# Patient Record
(demographics unavailable — no encounter records)

---

## 2024-10-27 NOTE — PHYSICAL EXAM
[No Acute Distress] : no acute distress [Well Nourished] : well nourished [Well-Appearing] : well-appearing [Normal Sclera/Conjunctiva] : normal sclera/conjunctiva [PERRL] : pupils equal round and reactive to light [Normal Outer Ear/Nose] : the outer ears and nose were normal in appearance [Normal Oropharynx] : the oropharynx was normal [No Lymphadenopathy] : no lymphadenopathy [Supple] : supple [No Respiratory Distress] : no respiratory distress  [No Accessory Muscle Use] : no accessory muscle use [Clear to Auscultation] : lungs were clear to auscultation bilaterally [Normal Rate] : normal rate  [Regular Rhythm] : with a regular rhythm [Normal S1, S2] : normal S1 and S2 [Soft] : abdomen soft [Non Tender] : non-tender [Non-distended] : non-distended [Normal Bowel Sounds] : normal bowel sounds [No Joint Swelling] : no joint swelling [Grossly Normal Strength/Tone] : grossly normal strength/tone [No Rash] : no rash [Coordination Grossly Intact] : coordination grossly intact [No Focal Deficits] : no focal deficits [Normal Gait] : normal gait [Deep Tendon Reflexes (DTR)] : deep tendon reflexes were 2+ and symmetric [Memory Grossly Normal] : memory grossly normal [Normal Affect] : the affect was normal [Normal Insight/Judgement] : insight and judgment were intact

## 2024-10-27 NOTE — HEALTH RISK ASSESSMENT
[Good] : ~his/her~ current health as good [Excellent] : ~his/her~  mood as  excellent [Yes] : Yes [2 - 4 times a month (2 pts)] : 2-4 times a month (2 points) [1 or 2 (0 pts)] : 1 or 2 (0 points) [Never (0 pts)] : Never (0 points) [No] : In the past 12 months have you used drugs other than those required for medical reasons? No [Little interest or pleasure doing things] : 1) Little interest or pleasure doing things [Feeling down, depressed, or hopeless] : 2) Feeling down, depressed, or hopeless [0] : 2) Feeling down, depressed, or hopeless: Not at all (0) [PHQ-2 Negative - No further assessment needed] : PHQ-2 Negative - No further assessment needed [Never] : Never [NO] : No [HIV test declined] : HIV test declined [Hepatitis C test declined] : Hepatitis C test declined [None] : None [With Family] : lives with family [# of Members in Household ___] :  household currently consist of [unfilled] member(s) [Employed] : employed [College] : College [Single] : single [Feels Safe at Home] : Feels safe at home [Reports normal functional visual acuity (ie: able to read med bottle)] : Reports normal functional visual acuity [Smoke Detector] : smoke detector [Carbon Monoxide Detector] : carbon monoxide detector [Safety elements used in home] : safety elements used in home [Seat Belt] :  uses seat belt [Sunscreen] : uses sunscreen [With Patient/Caregiver] : , with patient/caregiver [Designated Healthcare Proxy] : Designated healthcare proxy [Name: ___] : Health Care Proxy's Name: [unfilled]  [Relationship: ___] : Relationship: [unfilled] [Audit-CScore] : 2 [de-identified] : three days a week weights, walking [[id number]] : 0 [de-identified] : trying to eat healthier, watching portions [Change in mental status noted] : No change in mental status noted [Language] : denies difficulty with language [Learning/Retaining New Information] : denies difficulty learning/retaining new information [Handling Complex Tasks] : denies difficulty handling complex tasks [Sexually Active] : not sexually active [High Risk Behavior] : no high risk behavior [Reports changes in hearing] : Reports no changes in hearing [Reports changes in vision] : Reports no changes in vision [Reports changes in dental health] : Reports no changes in dental health [Travel to Developing Areas] : does not  travel to developing areas [TB Exposure] : is not being exposed to tuberculosis [Caregiver Concerns] : does not have caregiver concerns [PapSmearDate] : 05/23 [AdvancecareDate] : 10/24 [FreeTextEntry4] : Secondary Health Care Proxy: friend; Julian Wheatley

## 2024-10-27 NOTE — PLAN
[FreeTextEntry1] : VISION SCREENING SAFETY / HEALTHY HABITS REVIEWED HEALTHY DIET AND LIFESTYLE MODIFICATIONS VACCINATIONS DISCUSSED: COVID, FLU CHECK ROUTINE LAB WORK FOLLOW-UP ALL SPECIALISTS AS DIRECTED; NAMES OF ALTERNATE ENDOCRINOLOGISTS GIVEN ROUTINE GYN EXAMS CALL WITH ANY QUESTIONS, CONCERNS OR CHANGES

## 2024-10-27 NOTE — HEALTH RISK ASSESSMENT
[Good] : ~his/her~ current health as good [Excellent] : ~his/her~  mood as  excellent [Yes] : Yes [2 - 4 times a month (2 pts)] : 2-4 times a month (2 points) [1 or 2 (0 pts)] : 1 or 2 (0 points) [Never (0 pts)] : Never (0 points) [No] : In the past 12 months have you used drugs other than those required for medical reasons? No [Little interest or pleasure doing things] : 1) Little interest or pleasure doing things [Feeling down, depressed, or hopeless] : 2) Feeling down, depressed, or hopeless [0] : 2) Feeling down, depressed, or hopeless: Not at all (0) [PHQ-2 Negative - No further assessment needed] : PHQ-2 Negative - No further assessment needed [Never] : Never [NO] : No [HIV test declined] : HIV test declined [Hepatitis C test declined] : Hepatitis C test declined [None] : None [With Family] : lives with family [# of Members in Household ___] :  household currently consist of [unfilled] member(s) [Employed] : employed [College] : College [Single] : single [Feels Safe at Home] : Feels safe at home [Reports normal functional visual acuity (ie: able to read med bottle)] : Reports normal functional visual acuity [Smoke Detector] : smoke detector [Carbon Monoxide Detector] : carbon monoxide detector [Safety elements used in home] : safety elements used in home [Seat Belt] :  uses seat belt [Sunscreen] : uses sunscreen [With Patient/Caregiver] : , with patient/caregiver [Designated Healthcare Proxy] : Designated healthcare proxy [Name: ___] : Health Care Proxy's Name: [unfilled]  [Relationship: ___] : Relationship: [unfilled] [Audit-CScore] : 2 [de-identified] : three days a week weights, walking [de-identified] : trying to eat healthier, watching portions [[id number]] : 0 [Change in mental status noted] : No change in mental status noted [Language] : denies difficulty with language [Learning/Retaining New Information] : denies difficulty learning/retaining new information [Handling Complex Tasks] : denies difficulty handling complex tasks [Sexually Active] : not sexually active [High Risk Behavior] : no high risk behavior [Reports changes in hearing] : Reports no changes in hearing [Reports changes in vision] : Reports no changes in vision [Reports changes in dental health] : Reports no changes in dental health [Travel to Developing Areas] : does not  travel to developing areas [TB Exposure] : is not being exposed to tuberculosis [Caregiver Concerns] : does not have caregiver concerns [PapSmearDate] : 05/23 [AdvancecareDate] : 10/24 [FreeTextEntry4] : Secondary Health Care Proxy: friend; Julian Wheatley

## 2024-10-27 NOTE — HISTORY OF PRESENT ILLNESS
[FreeTextEntry1] : physical [de-identified] : MS. DONOHUE IS A PLEASANT 36 YO PRESENTING FOR YEALRY PHYSICAL.  HISTORY OF PCOS, HYPERCHOLESTEROLEMIA, VITAMIN D DEFICIENCY, OBESITY AS WELL AS ASTHMA.  STARTED ON METFORMIN BY ENDOCRINOLOGY BUT STOPPED IT AFTER A WEEK AS DIDN'T FEEL WELL ON IT.  WOULD LIKE TO SEE ALTERNATE ENDOCRINOLOGIST.  STARTED SEEING A DIETICIAN.  IS FEELING WELL TODAY. Lmp: 10/5/24

## 2024-10-27 NOTE — HISTORY OF PRESENT ILLNESS
[FreeTextEntry1] : physical [de-identified] : MS. DONOHUE IS A PLEASANT 36 YO PRESENTING FOR YEALRY PHYSICAL.  HISTORY OF PCOS, HYPERCHOLESTEROLEMIA, VITAMIN D DEFICIENCY, OBESITY AS WELL AS ASTHMA.  STARTED ON METFORMIN BY ENDOCRINOLOGY BUT STOPPED IT AFTER A WEEK AS DIDN'T FEEL WELL ON IT.  WOULD LIKE TO SEE ALTERNATE ENDOCRINOLOGIST.  STARTED SEEING A DIETICIAN.  IS FEELING WELL TODAY. Lmp: 10/5/24

## 2024-11-26 NOTE — PHYSICAL EXAM
[de-identified] :                    Well appearing and nourished with no obvious deformities or distress.  Eyes:  No conjunctival injection and no xanthelasmas. HEENT:  Normocephalic.Normal oral mucosa. No pallor or cyanosis Neck:  No jugular venous distension. with normal A and V wave forms. No palpable adenopathy. Cardiovascular:  Normal rate and rhythm with normal S1, S2 and a grade 1/6 systolic murmur. Distal arterial pulses are normal. No significant peripheral edema. Pulmonary:  Lungs are clear to auscultation and percussion. Normal respiratory pattern without any accessory muscle use Abdomen:  Soft, non-tender ; no palpable organomegaly or masses. Extremities: No digital clubbing, cyanosis or ischemic changes. Skin:  No skin lesions, rashes, ulcers or xanthomas. Psychiatric:  Alert and oriented to person, place and time. Appropriate mood and affect.

## 2024-11-26 NOTE — ASSESSMENT
[FreeTextEntry1] : ECG: Normal sinus rhythm at 63 beats minute.  Normal axis and intervals.  No significant ST-T wave changes.  Laboratory data: ----10/26/2024 Chol----223 HDL----68 LDL---138 Trigs---101 P9a----5.2  Impression: 1.  Chest pain is not due to ischemic etiology.  2.  Sudden onset of chest pain in association with calf pain, raising the concern that this could have been a DVT and PE presentation. There was no D-dimer performed.  There was no CT of the chest.  3.  Mild obesity with BMI - 31  4.  Hyperlipidemia longstanding in nature which has been fairly constant.  Plan: 1.  Chest CT rule out PE protocol.  2.  Instructed the patient about the benefits of a diet that restricts portion sizes, increases frequency of meals and consists of  vegetables, (more green and leafy),fruit and nuts, whole grains, lean proteins and limits carbohydrates and meat and dairy fats  3.  The patient was instructed to follow a symptom limited regimen of moderate aerobic exercise for 30 minutes 3 to 4 days a week. A warm up and cool down period are to be added to the regimen and small incremental changes can be made every few weeks. Any new symptoms of exercise related chest pain or dyspnea should be reported.  4.  Repeat blood work in 3 months and if LDL still persistently elevated we will need to begin statin therapy.  Patient understands the implications of the above.

## 2024-11-26 NOTE — REASON FOR VISIT
[FreeTextEntry1] : 37 old female presents here for cardiac evaluation following an ER hospital visit on 11/21/2024. Patient awakened from sleep with left calf pain which lasted about 5 hours. Coincident with that she began having left upper chest discomfort which lasted 2 to 3 hours.  Discomfort was constant in nature and not related to position movement and breathing. She has not had any the symptoms before and they all spontaneously dissipated without intervention.  ER workup consisted of blood work and a chest x-ray  There is no history of chest wall trauma and there is no major exertional activity in the day or 2 before.  Patient walks regularly and has no exertional difficulty. There is no pre-existing history of any cardiovascular disease. History of hyperlipidemia Denies tobacco use, diabetes, hypertension. No family history premature coronary disease.  Only other medical history is that of PCOS.

## 2025-01-09 NOTE — HISTORY OF PRESENT ILLNESS
[N] : Patient denies prior pregnancies [Regular Cycle Intervals] : periods have been regular [Frequency: Q ___ days] : menstrual periods occur approximately every [unfilled] days [FreeTextEntry1] : 38-year-old G0, P0 last menstrual cycle was December 2024 presents for routine GYN evaluation.  She is presently not on metformin, metformin does not agree with her, and she reports regular menstrual cycle.  She is presently not sexually active and uses nothing for contraception. [PGHxTotal] : 0 [PGHxFullTerm] : 0 [PGHxPremature] : 0 [PGHxAbortions] : 0 [BannerxLiving] : 0 [PGHxABInduced] : 0 [PGHxABSpont] : 0 [PGHxEctopic] : 0 [PGHxMultBirths] : 0

## 2025-01-09 NOTE — PHYSICAL EXAM
[Chaperone Present] : A chaperone was present in the examining room during all aspects of the physical examination [[id number]] : A chaperone was present during the pelvic exam. [Appropriately responsive] : appropriately responsive [Alert] : alert [No Acute Distress] : no acute distress [No Lymphadenopathy] : no lymphadenopathy [Regular Rate Rhythm] : regular rate rhythm [No Murmurs] : no murmurs [Clear to Auscultation B/L] : clear to auscultation bilaterally [Soft] : soft [Non-tender] : non-tender [Non-distended] : non-distended [No HSM] : No HSM [No Lesions] : no lesions [No Mass] : no mass [Oriented x3] : oriented x3 [Examination Of The Breasts] : a normal appearance [No Masses] : no breast masses were palpable [Labia Majora] : normal [Labia Minora] : normal [Normal] : normal [Uterine Adnexae] : normal [Declined] : Patient declined rectal exam [FreeTextEntry2] : Casie [FreeTextEntry5] : Cervical ectropion noted and patient complains of mucousy discharge on regular basis.

## 2025-01-09 NOTE — DISCUSSION/SUMMARY
[FreeTextEntry1] : 38-year-old  with history of polycystic ovarian syndrome presents for follow-up care.  Denies pain bleeding or discharge.  No longer on metformin because he did not agree with her.  Physical exam is normal.  Cervical ectropion noted Pap GC chlamydia sent.  Diet and exercise reviewed and patient to report back in 1 year for follow-up.

## 2025-01-09 NOTE — HISTORY OF PRESENT ILLNESS
[N] : Patient denies prior pregnancies [Regular Cycle Intervals] : periods have been regular [Frequency: Q ___ days] : menstrual periods occur approximately every [unfilled] days [FreeTextEntry1] : 38-year-old G0, P0 last menstrual cycle was December 2024 presents for routine GYN evaluation.  She is presently not on metformin, metformin does not agree with her, and she reports regular menstrual cycle.  She is presently not sexually active and uses nothing for contraception. [PGHxTotal] : 0 [PGHxFullTerm] : 0 [PGHxPremature] : 0 [PGHxAbortions] : 0 [[facility] Hospital[facility]Living] : 0 [PGHxABInduced] : 0 [PGHxABSpont] : 0 [PGHxEctopic] : 0 [PGHxMultBirths] : 0

## 2025-01-09 NOTE — PHYSICAL EXAM
[Chaperone Present] : A chaperone was present in the examining room during all aspects of the physical examination [[id number]] : A chaperone was present during the pelvic exam. [Appropriately responsive] : appropriately responsive [Alert] : alert [No Acute Distress] : no acute distress [No Lymphadenopathy] : no lymphadenopathy [Regular Rate Rhythm] : regular rate rhythm [No Murmurs] : no murmurs [Clear to Auscultation B/L] : clear to auscultation bilaterally [Soft] : soft [Non-tender] : non-tender [Non-distended] : non-distended [No HSM] : No HSM [No Lesions] : no lesions [No Mass] : no mass [Oriented x3] : oriented x3 [Examination Of The Breasts] : a normal appearance [No Masses] : no breast masses were palpable [Labia Majora] : normal [Labia Minora] : normal [Normal] : normal [Uterine Adnexae] : normal [Declined] : Patient declined rectal exam [FreeTextEntry2] : Casie [FreeTextEntry5] : Cervical ectropion noted and patient complains of mucousy discharge on regular basis.

## 2025-03-13 NOTE — DISCUSSION/SUMMARY
[FreeTextEntry1] :  #1. Will schedule lung function testing in near future to assess lung function. FeNO from 3/13/25 was low at 16 ppb. #2. The patient does not appear to require chronic BD therapy at this time but continue Albuterol prn. #3. Diet and exercise for weight loss. #4. SOBOE is likely at least somewhat related to weight or deconditioning. #5. CXR to evaluate SOBOE. #6. Denies sleep complaints. #7. Never smoker. #8. S/p Covid vaccines and infections. #9. Flonase nasal spray for postnasal drip as needed. Zyrtec/Allegra/Claritin for allergies as needed. #10. F/u in 8 weeks with laly and CXR.   The patient expressed understanding and agreement with the above recommendations/plan and accepts responsibility to be compliant with recommended testing, therapies, and f/u visits. All relevant questions and concerns were addressed.

## 2025-03-13 NOTE — HISTORY OF PRESENT ILLNESS
[Intermittent] : intermittent [Dyspnea on Exertion] : dyspnea on exertion [None] : No associated symptoms are reported [Inhaled Short-Acting Beta-2 Agonists] : inhaled short-acting beta-2 agonists [Good Compliance] : good compliance with treatment [Good Tolerance] : good tolerance of treatment [Good Symptom Control] : good symptom control [Initial Eval - Existing Diagnosis] : an initial evaluation of an existing diagnosis of [Excess Weight] : excess weight [Currently Experiencing] : The patient is currently experiencing symptoms. [TextBox_4] : Never smoker. S/p Covid infections with mild symptoms. Patient c/o SOBOE but is otherwise without associated respiratory complaints.  Possible intermittent or exercise induced asthma.

## 2025-03-13 NOTE — CONSULT LETTER
[Dear  ___] : Dear  [unfilled], [Consult Letter:] : I had the pleasure of evaluating your patient, [unfilled]. [Please see my note below.] : Please see my note below. [Consult Closing:] : Thank you very much for allowing me to participate in the care of this patient.  If you have any questions, please do not hesitate to contact me. [Sincerely,] : Sincerely, [FreeTextEntry3] : Huang Arreaga MD, FCCP, D. ABS[facility] Pulmonary and Sleep Medicine [facility] at [place]

## 2025-03-13 NOTE — PROCEDURE
[FreeTextEntry1] : Lung function testing deferred for now until can be scheduled. FeNO 3/13/25 - 16 ppb (low).

## 2025-03-13 NOTE — END OF VISIT
[Time Spent: ___ minutes] : I have spent [unfilled] minutes of time on the encounter which excludes teaching and separately reported services. [TextEntry] : Discussed with pt at length regarding asthma, soboe, prior Covid infections, obesity; reviewed w/u with pt as above.

## 2025-05-29 NOTE — PROCEDURE
[FreeTextEntry1] : FeNO 3/13/25 - 16 ppb (low). Naveed 5/29/25 - low normal FVC and mildly reduced FEV1 without obstruction.

## 2025-05-29 NOTE — CONSULT LETTER
[Dear  ___] : Dear  [unfilled], [Consult Letter:] : I had the pleasure of evaluating your patient, [unfilled]. [Please see my note below.] : Please see my note below. [Consult Closing:] : Thank you very much for allowing me to participate in the care of this patient.  If you have any questions, please do not hesitate to contact me. [Sincerely,] : Sincerely, [FreeTextEntry3] : Huang Arreaga MD, FCCP, D. ABS[facility] Pulmonary and Sleep Medicine [facility] at [place]

## 2025-05-29 NOTE — RESULTS/DATA
[TextEntry] : CXR from 5/29/25 appeared to be clear on my review but results are pending - reviewed results and films with patient.

## 2025-05-29 NOTE — DISCUSSION/SUMMARY
[FreeTextEntry1] :  #1. FeNO from 3/13/25 was low at 16 ppb. [place] from 5/29/25 revealed a borderline normal FVC and mildly reduced FEV1 but without obstruction. #2. Trial of Breo 100 for asthma with Albuterol prn. #3. Diet and exercise for weight loss. #4. SOBOE is likely at least somewhat related to weight or deconditioning. #5. CXR to evaluate SOBOE on 5/29/25 appeared to be clear but official results are pending. #6. Denies sleep complaints. #7. Never smoker. #8. S/p Covid vaccines and infections. #9. Flonase nasal spray for postnasal drip as needed. Zyrtec/Allegra/Claritin for allergies as needed. #10. F/u in 2-3 months with laly to assess response to Breo 100.   The patient expressed understanding and agreement with the above recommendations/plan and accepts responsibility to be compliant with recommended testing, therapies, and f/u visits. All relevant questions and concerns were addressed.

## 2025-05-29 NOTE — HISTORY OF PRESENT ILLNESS
[Intermittent] : intermittent [Dyspnea on Exertion] : dyspnea on exertion [None] : No associated symptoms are reported [Inhaled Short-Acting Beta-2 Agonists] : inhaled short-acting beta-2 agonists [Good Compliance] : good compliance with treatment [Good Tolerance] : good tolerance of treatment [Good Symptom Control] : good symptom control [Follow-Up - Routine Clinic] : a routine clinic follow-up of [Excess Weight] : excess weight [Currently Experiencing] : The patient is currently experiencing symptoms. [TextBox_4] : Never smoker. S/p Covid infections with mild symptoms. Patient c/o SOBOE but is otherwise without associated respiratory complaints.  Possible intermittent or exercise induced asthma. Pt denies significant sleep complaints.

## 2025-06-05 NOTE — ASSESSMENT
[FreeTextEntry1] : ECG: Normal sinus rhythm at 76beats minute.  Normal axis and intervals.  No significant ST-T wave changes.  Laboratory data: ------10/26/24--5/2925 Chol----223------221 HDL-----68--------60 LDL----138-------140 Trigs---101-------120 B3o-----0.2  Chest CT PE protocol 3/26/2025 Suboptimal contrast bolus Lobar and segmental pulmonary artery showing no filling defects.   (More distal subsegmental pulmonary arteries are not adequately visualized) No other significant intrathoracic abnormality is identified.   Impression: 1.  Chest pain is not due to ischemic etiology.  Chest CT does not show any significant intrathoracic pathology. Larger vessel pulmonary arteries do not show any evidence of thrombus.  2.  Sudden onset of chest pain in association with calf pain, raising the concern that this could have been a DVT and PE presentation.  While there was nearly a 4-month interval between the ER visit and the CT, no pulmonary embolus has been identified. There was no D-dimer performed.  There was no CT of the chest.  3.  Mild obesity with BMI - 31  4.  Hyperlipidemia unchanged despite more aggressive diet and exercise.  Plan: 1.  Continuation of atorvastatin 20 mg daily with blood work at a 3-month interval.  2.  Instructed the patient about the benefits of a diet that restricts portion sizes, increases frequency of meals and consists of  vegetables, (more green and leafy),fruit and nuts, whole grains, lean proteins and limits carbohydrates and meat and dairy fats  3.  The patient was instructed to follow a symptom limited regimen of moderate aerobic exercise for 30 minutes 3 to 4 days a week. A warm up and cool down period are to be added to the regimen and small incremental changes can be made every few weeks. Any new symptoms of exercise related chest pain or dyspnea should be reported.  4.  No indication for any other cardiac testing and a 1 year follow-up is recommended.

## 2025-06-05 NOTE — PHYSICAL EXAM
[de-identified] :                    Well appearing and nourished with no obvious deformities or distress.  Eyes:  No conjunctival injection and no xanthelasmas. HEENT:  Normocephalic.Normal oral mucosa. No pallor or cyanosis Neck:  No jugular venous distension. with normal A and V wave forms. No palpable adenopathy. Cardiovascular:  Normal rate and rhythm with normal S1, S2 and a grade 1/6 systolic murmur. Distal arterial pulses are normal. No significant peripheral edema. Pulmonary:  Lungs are clear to auscultation and percussion. Normal respiratory pattern without any accessory muscle use Abdomen:  Soft, non-tender ; no palpable organomegaly or masses. Extremities: No digital clubbing, cyanosis or ischemic changes. Skin:  No skin lesions, rashes, ulcers or xanthomas. Psychiatric:  Alert and oriented to person, place and time. Appropriate mood and affect.

## 2025-06-05 NOTE — PHYSICAL EXAM
[de-identified] :                    Well appearing and nourished with no obvious deformities or distress.  Eyes:  No conjunctival injection and no xanthelasmas. HEENT:  Normocephalic.Normal oral mucosa. No pallor or cyanosis Neck:  No jugular venous distension. with normal A and V wave forms. No palpable adenopathy. Cardiovascular:  Normal rate and rhythm with normal S1, S2 and a grade 1/6 systolic murmur. Distal arterial pulses are normal. No significant peripheral edema. Pulmonary:  Lungs are clear to auscultation and percussion. Normal respiratory pattern without any accessory muscle use Abdomen:  Soft, non-tender ; no palpable organomegaly or masses. Extremities: No digital clubbing, cyanosis or ischemic changes. Skin:  No skin lesions, rashes, ulcers or xanthomas. Psychiatric:  Alert and oriented to person, place and time. Appropriate mood and affect.

## 2025-06-05 NOTE — HISTORY OF PRESENT ILLNESS
[FreeTextEntry1] : Exercises regularly without difficulty. There has been no recurrence of the calf and or chest pains.

## 2025-06-05 NOTE — REASON FOR VISIT
[FreeTextEntry1] : 37 old female presents here for cardiac evaluation   She had an ER hospital visit on 11/21/2024. for c/o left calf pain x  5 hours with left upper chest discomfort which lasted 2 to 3 hours.  Discomfort was constant in nature and not related to position movement and breathing. She has not had any the symptoms before and they all spontaneously dissipated without intervention. ER workup consisted of blood work and a chest x-ray There is no history of chest wall trauma and there is no major exertional activity in the day or 2 before. We ordered a chest CTA PE protocol.  Patient walks regularly and has no exertional difficulty. There is no pre-existing history of any cardiovascular disease. +History of hyperlipidemia Denies tobacco use, diabetes, hypertension. No family history premature coronary disease.  Only other medical history is that of PCOS.

## 2025-06-05 NOTE — ASSESSMENT
[FreeTextEntry1] : ECG: Normal sinus rhythm at 76beats minute.  Normal axis and intervals.  No significant ST-T wave changes.  Laboratory data: ------10/26/24--5/2925 Chol----223------221 HDL-----68--------60 LDL----138-------140 Trigs---101-------120 Q9r-----4.2  Chest CT PE protocol 3/26/2025 Suboptimal contrast bolus Lobar and segmental pulmonary artery showing no filling defects.   (More distal subsegmental pulmonary arteries are not adequately visualized) No other significant intrathoracic abnormality is identified.   Impression: 1.  Chest pain is not due to ischemic etiology.  Chest CT does not show any significant intrathoracic pathology. Larger vessel pulmonary arteries do not show any evidence of thrombus.  2.  Sudden onset of chest pain in association with calf pain, raising the concern that this could have been a DVT and PE presentation.  While there was nearly a 4-month interval between the ER visit and the CT, no pulmonary embolus has been identified. There was no D-dimer performed.  There was no CT of the chest.  3.  Mild obesity with BMI - 31  4.  Hyperlipidemia unchanged despite more aggressive diet and exercise.  Plan: 1.  Continuation of atorvastatin 20 mg daily with blood work at a 3-month interval.  2.  Instructed the patient about the benefits of a diet that restricts portion sizes, increases frequency of meals and consists of  vegetables, (more green and leafy),fruit and nuts, whole grains, lean proteins and limits carbohydrates and meat and dairy fats  3.  The patient was instructed to follow a symptom limited regimen of moderate aerobic exercise for 30 minutes 3 to 4 days a week. A warm up and cool down period are to be added to the regimen and small incremental changes can be made every few weeks. Any new symptoms of exercise related chest pain or dyspnea should be reported.  4.  No indication for any other cardiac testing and a 1 year follow-up is recommended.

## 2025-06-05 NOTE — ASSESSMENT
[FreeTextEntry1] : ECG: Normal sinus rhythm at 76beats minute.  Normal axis and intervals.  No significant ST-T wave changes.  Laboratory data: ------10/26/24--5/2925 Chol----223------221 HDL-----68--------60 LDL----138-------140 Trigs---101-------120 I9i-----7.2  Chest CT PE protocol 3/26/2025 Suboptimal contrast bolus Lobar and segmental pulmonary artery showing no filling defects.   (More distal subsegmental pulmonary arteries are not adequately visualized) No other significant intrathoracic abnormality is identified.   Impression: 1.  Chest pain is not due to ischemic etiology.  Chest CT does not show any significant intrathoracic pathology. Larger vessel pulmonary arteries do not show any evidence of thrombus.  2.  Sudden onset of chest pain in association with calf pain, raising the concern that this could have been a DVT and PE presentation.  While there was nearly a 4-month interval between the ER visit and the CT, no pulmonary embolus has been identified. There was no D-dimer performed.  There was no CT of the chest.  3.  Mild obesity with BMI - 31  4.  Hyperlipidemia unchanged despite more aggressive diet and exercise.  Plan: 1.  Continuation of atorvastatin 20 mg daily with blood work at a 3-month interval.  2.  Instructed the patient about the benefits of a diet that restricts portion sizes, increases frequency of meals and consists of  vegetables, (more green and leafy),fruit and nuts, whole grains, lean proteins and limits carbohydrates and meat and dairy fats  3.  The patient was instructed to follow a symptom limited regimen of moderate aerobic exercise for 30 minutes 3 to 4 days a week. A warm up and cool down period are to be added to the regimen and small incremental changes can be made every few weeks. Any new symptoms of exercise related chest pain or dyspnea should be reported.  4.  No indication for any other cardiac testing and a 1 year follow-up is recommended.

## 2025-06-05 NOTE — PHYSICAL EXAM
[de-identified] :                    Well appearing and nourished with no obvious deformities or distress.  Eyes:  No conjunctival injection and no xanthelasmas. HEENT:  Normocephalic.Normal oral mucosa. No pallor or cyanosis Neck:  No jugular venous distension. with normal A and V wave forms. No palpable adenopathy. Cardiovascular:  Normal rate and rhythm with normal S1, S2 and a grade 1/6 systolic murmur. Distal arterial pulses are normal. No significant peripheral edema. Pulmonary:  Lungs are clear to auscultation and percussion. Normal respiratory pattern without any accessory muscle use Abdomen:  Soft, non-tender ; no palpable organomegaly or masses. Extremities: No digital clubbing, cyanosis or ischemic changes. Skin:  No skin lesions, rashes, ulcers or xanthomas. Psychiatric:  Alert and oriented to person, place and time. Appropriate mood and affect.